# Patient Record
Sex: MALE | Race: BLACK OR AFRICAN AMERICAN | NOT HISPANIC OR LATINO | Employment: FULL TIME | URBAN - METROPOLITAN AREA
[De-identification: names, ages, dates, MRNs, and addresses within clinical notes are randomized per-mention and may not be internally consistent; named-entity substitution may affect disease eponyms.]

---

## 2019-08-17 ENCOUNTER — HOSPITAL ENCOUNTER (EMERGENCY)
Facility: HOSPITAL | Age: 49
Discharge: HOME | End: 2019-08-17
Attending: EMERGENCY MEDICINE
Payer: COMMERCIAL

## 2019-08-17 VITALS
RESPIRATION RATE: 17 BRPM | HEIGHT: 71 IN | SYSTOLIC BLOOD PRESSURE: 137 MMHG | OXYGEN SATURATION: 98 % | BODY MASS INDEX: 32.2 KG/M2 | TEMPERATURE: 98.2 F | DIASTOLIC BLOOD PRESSURE: 92 MMHG | WEIGHT: 230 LBS | HEART RATE: 85 BPM

## 2019-08-17 DIAGNOSIS — H92.01 RIGHT EAR PAIN: Primary | ICD-10-CM

## 2019-08-17 PROCEDURE — 99282 EMERGENCY DEPT VISIT SF MDM: CPT

## 2019-08-17 PROCEDURE — 99283 EMERGENCY DEPT VISIT LOW MDM: CPT | Performed by: EMERGENCY MEDICINE

## 2019-08-18 NOTE — ED PROVIDER NOTES
Pt Name: Wm Bhandari  MRN: [id number]  [place] 1970  Age/Sex: 53 y o  male  Date of evaluation: 8/17/2019  PCP: Elodia Pulido MD    [address] [place] [address]    Chief Complaint   Patient presents with    Ear Problem     pt c/o his right ear feeling clogged, pt states it started eysterday but has had no improvement         HPI    Imani Maciel presents to the Emergency Department complaining of fullness in his right ear  He slept on that side and then felt pressure and a clogged sensation  He feels like if he could "pop" it, the it would feel better  No fever  No other symptoms  HPI      Past Medical and Surgical History    Past Medical History:   Diagnosis Date    Diabetes mellitus ([facility] [address] )     Hyperlipidemia     Hypertension        History reviewed  No pertinent surgical history  History reviewed  No pertinent family history  Social History     Tobacco Use    Smoking status: Never Smoker    Smokeless tobacco: Never Used   Substance Use Topics    Alcohol use: Never     Frequency: Never    Drug use: Never           Allergies    Allergies   Allergen Reactions    Glipizide      Side/flank was hurting       Home Medications    Prior to Admission medications    Medication Sig Start Date End Date Taking? Authorizing Provider   amLODIPine (NORVASC) 5 mg tablet Take 5 mg by mouth daily 2/25/19   Historical Provider, MD   INVOKANA 300 MG TABS Take 300 mg by mouth daily 3/26/19   Historical Provider, MD   JANUMEALEXY  MG per tablet Take 1 tablet by mouth 2 (two) times a day 2/25/19   Historical Provider, MD   VIAGRA 100 MG tablet TAKE 1 TABLET BY MOUTH AS DIRECTED, DO NOT REPEAT FOR 24 HOURS 3/22/19   Historical Provider, MD           Review of Systems    Review of Systems   Constitutional: Negative for activity change, appetite change, chills, fatigue and fever  HENT: Positive for ear pain  Negative for congestion, rhinorrhea, sinus pressure, sneezing, sore throat and trouble swallowing      Eyes: Negative for photophobia and visual disturbance  Respiratory: Negative for chest tightness, shortness of breath and wheezing  Cardiovascular: Negative for chest pain and leg swelling  Gastrointestinal: Negative for abdominal distention, abdominal pain, constipation, diarrhea, nausea and vomiting  Endocrine: Negative for polydipsia, polyphagia and polyuria  Genitourinary: Negative for decreased urine volume, difficulty urinating, dysuria, flank pain, frequency and urgency  Musculoskeletal: Negative for back pain, gait problem, joint swelling and neck pain  Skin: Negative for color change, pallor and rash  Allergic/Immunologic: Negative for immunocompromised state  Neurological: Negative for seizures, syncope, speech difficulty, weakness, light-headedness and headaches  Psychiatric/Behavioral: Negative for confusion  All other systems reviewed and are negative  Physical Exam      ED Triage Vitals [08/17/19 2040]   Temperature Pulse Respirations Blood Pressure SpO2   98 2 °F (36 8 °C) 85 17 137/92 98 %      Temp Source Heart Rate Source Patient Position - Orthostatic VS BP Location FiO2 (%)   Oral Monitor Sitting Right arm --      Pain Score       No Pain               Physical Exam   Constitutional: He is oriented to person, place, and time  He appears well-developed and well-nourished  No distress  HENT:   Head: Normocephalic and atraumatic  Right Ear: No drainage, swelling or tenderness  Tympanic membrane is not injected, not perforated, not erythematous, not retracted and not bulging  A middle ear effusion is present  No decreased hearing is noted  Nose: Nose normal    Mouth/Throat: Oropharynx is clear and moist    Significant soft cerumen is present without impaction  Eyes: Pupils are equal, round, and reactive to light  Conjunctivae and EOM are normal    Neck: Normal range of motion  Neck supple  Cardiovascular: Normal rate, regular rhythm and normal heart sounds   Exam reveals no gallop and no friction rub  No murmur heard  Pulmonary/Chest: Effort normal and breath sounds normal  No respiratory distress  He has no wheezes  He has no rales  Abdominal: Soft  Bowel sounds are normal  There is no tenderness  There is no rebound and no guarding  Musculoskeletal: Normal range of motion  Neurological: He is alert and oriented to person, place, and time  Skin: Skin is warm and dry  He is not diaphoretic  Psychiatric: He has a normal mood and affect  His behavior is normal    Nursing note and vitals reviewed  Assessment and Plan    Vane Joya is a 53 y o  male who presents with ear pressure and clogged feeling   Physical examination remarkable for soft cerumen but otherwise normal   There is some fluid/ effusion as well  Plan will be to perform diagnostic testing and treat symptomatically  MDM    Diagnostic Results      Labs:    No results found for this or any previous visit  All labs reviewed and utilized in the medical decision making process    Radiology:    No orders to display       All radiology studies independently viewed by me and interpreted by the radiologist     Procedure    Procedures    [facility]      ED Course of Care and Re-Assessments        Medications - No data to display        FINAL IMPRESSION    Final diagnoses:   Right ear pain         DISPOSITION/PLAN      Time reflects when diagnosis was documented in both MDM as applicable and the Disposition within this note     Time User Action Codes Description Comment    8/17/2019  9:05 PM Delores Ordoñez Add [H92 01] Right ear pain       ED Disposition     ED Disposition Condition Date/Time Comment    Discharge Stable Sat Aug 17, 2019  9:04 PM Vane Joya discharge to home/self care              Follow-up Information     Follow up With Specialties Details Why Edie Soto MD  Schedule an appointment as soon as possible for a visit   [address] [facility] [address] [facility]       Simi Young MD Otolaryngology Schedule an appointment as soon as possible for a visit   [place] [address]  [phone]              PATIENT REFERRED TO:    Sagar Pittman MD  [address] [place] [address]  [phone]    Schedule an appointment as soon as possible for a visit       Simi Young MD  [address] [place] [address]  [phone]    Schedule an appointment as soon as possible for a visit         DISCHARGE MEDICATIONS:    Patient's Medications   Discharge Prescriptions    CARBAMIDE PEROXIDE (DEBROX) 6 5 % OTIC SOLUTION    Administer 5 drops into ears 2 (two) times a day       Start Date: 8/17/2019 End Date: --       Order Dose: 5 drops       Quantity: 15 mL    Refills: 0       No discharge procedures on file           Simeon Daniel, [address] [place] [address],   08/17/19 2472

## 2022-06-01 ENCOUNTER — HOSPITAL ENCOUNTER (EMERGENCY)
Facility: HOSPITAL | Age: 52
Discharge: HOME | End: 2022-06-01
Attending: EMERGENCY MEDICINE | Admitting: EMERGENCY MEDICINE
Payer: COMMERCIAL

## 2022-06-01 VITALS
HEART RATE: 89 BPM | BODY MASS INDEX: 29.26 KG/M2 | SYSTOLIC BLOOD PRESSURE: 147 MMHG | RESPIRATION RATE: 18 BRPM | WEIGHT: 209 LBS | HEIGHT: 71 IN | DIASTOLIC BLOOD PRESSURE: 79 MMHG | TEMPERATURE: 98.2 F | OXYGEN SATURATION: 100 %

## 2022-06-01 DIAGNOSIS — H61.21 IMPACTED CERUMEN OF RIGHT EAR: Primary | ICD-10-CM

## 2022-06-01 DIAGNOSIS — H91.90 HEARING LOSS: ICD-10-CM

## 2022-06-01 PROCEDURE — 99284 EMERGENCY DEPT VISIT MOD MDM: CPT | Performed by: EMERGENCY MEDICINE

## 2022-06-01 PROCEDURE — 69209 REMOVE IMPACTED EAR WAX UNI: CPT | Performed by: EMERGENCY MEDICINE

## 2022-06-01 PROCEDURE — 99283 EMERGENCY DEPT VISIT LOW MDM: CPT

## 2022-06-01 NOTE — ED PROVIDER NOTES
Pt Name: Jose M Kelly  MRN: [id number]  [place] 1970  Age/Sex: 49 y o  male  Date of evaluation: 6/1/2022  PCP: Deepa Garcia MD    [address] [place] [address]    Chief Complaint   Patient presents with    Hearing Problem     Patient reports difficulty hearing out of right ear that began about 1 week ago  Patient states that he can here its just really low or it sounds like his ear is clogged  No other symptoms described         HPI    49 y o  male presenting with inability to hear out of the right ear  He states that about a week ago he began having decreasing hearing in the right ear, states that he feels that it is clogged  He tried Debrox at home without relief  He denies pain, nausea, vomiting, diarrhea, numbness, weakness, changes in speech or vision, trauma, other symptoms  HPI      Past Medical and Surgical History    Past Medical History:   Diagnosis Date    Diabetes mellitus ([facility] [address] )     Hyperlipidemia     Hypertension        History reviewed  No pertinent surgical history  History reviewed  No pertinent family history  Social History     Tobacco Use    Smoking status: Never Smoker    Smokeless tobacco: Never Used   Substance Use Topics    Alcohol use: Never    Drug use: Never           Allergies    Allergies   Allergen Reactions    Glipizide Other (See Comments)     Side/flank was hurting-     6/1/2022 patient states that he does not recall this and reports no other allergies       Home Medications    Prior to Admission medications    Medication Sig Start Date End Date Taking?  Authorizing Provider   amLODIPine (NORVASC) 5 mg tablet Take 5 mg by mouth daily 2/25/19   Historical Provider, MD   carbamide peroxide (DEBROX) 6 5 % otic solution Administer 5 drops into ears 2 (two) times a day 8/17/19   Nery Lyons DO   INVOKANA 300 MG TABS Take 300 mg by mouth daily 3/26/19   Historical Provider, MD BABB  MG per tablet Take 1 tablet by mouth 2 (two) times a day 2/25/19 Historical Provider, MD   VIAGRA 100 MG tablet TAKE 1 TABLET BY MOUTH AS DIRECTED, DO NOT REPEAT FOR 24 HOURS 3/22/19   Historical Provider, MD           Review of Systems    Review of Systems   Constitutional: Negative for appetite change, chills and diaphoresis  HENT: Positive for hearing loss  Negative for drooling, facial swelling, trouble swallowing and voice change  Respiratory: Negative for apnea, shortness of breath and wheezing  Cardiovascular: Negative for chest pain and leg swelling  Gastrointestinal: Negative for abdominal distention, abdominal pain, diarrhea, nausea and vomiting  Genitourinary: Negative for dysuria and urgency  Musculoskeletal: Negative for arthralgias, back pain, gait problem and neck pain  Skin: Negative for color change, rash and wound  Neurological: Negative for seizures, speech difficulty, weakness and headaches  Psychiatric/Behavioral: Negative for agitation, behavioral problems and dysphoric mood  The patient is not nervous/anxious  All other systems reviewed and negative  Physical Exam      ED Triage Vitals   Temperature Pulse Respirations Blood Pressure SpO2   06/01/22 0119 06/01/22 0116 06/01/22 0116 06/01/22 0116 06/01/22 0116   98 2 °F (36 8 °C) 88 18 146/77 99 %      Temp Source Heart Rate Source Patient Position - Orthostatic VS BP Location FiO2 (%)   06/01/22 0119 06/01/22 0116 06/01/22 0116 06/01/22 0116 --   Oral Monitor Sitting Right arm       Pain Score       06/01/22 0116       No Pain               Physical Exam  Vitals and nursing note reviewed  Constitutional:       Appearance: He is well-developed  HENT:      Head: Normocephalic and atraumatic  Right Ear: External ear normal  There is impacted cerumen  Left Ear: Tympanic membrane, ear canal and external ear normal       Ears:      Comments: Small amount of earwax noted in left ear, right ear completely occluded with cerumen    Eyes:      Conjunctiva/sclera: Conjunctivae normal       Pupils: Pupils are equal, round, and reactive to light  Neck:      Trachea: No tracheal deviation  Cardiovascular:      Rate and Rhythm: Normal rate and regular rhythm  Heart sounds: Normal heart sounds  No murmur heard  Pulmonary:      Effort: Pulmonary effort is normal  No respiratory distress  Breath sounds: Normal breath sounds  No stridor  No wheezing or rales  Abdominal:      General: There is no distension  Palpations: Abdomen is soft  Tenderness: There is no abdominal tenderness  There is no guarding or rebound  Musculoskeletal:         General: No deformity  Normal range of motion  Cervical back: Normal range of motion and neck supple  Skin:     General: Skin is warm and dry  Findings: No rash  Neurological:      Mental Status: He is alert and oriented to person, place, and time  Psychiatric:         Behavior: Behavior normal          Thought Content: Thought content normal          Judgment: Judgment normal               Diagnostic Results      Labs:    Results Reviewed     None          All labs reviewed and utilized in the medical decision making process    Radiology:    No orders to display       All radiology studies independently viewed by me and interpreted by the radiologist     Procedure    Ear cerumen removal    Date/Time: 6/1/2022 1:20 AM  Performed by: Fernando Trimble MD  Authorized by: Fernando Trimble MD   Universal Protocol:  Consent: Verbal consent obtained  Risks and benefits: risks, benefits and alternatives were discussed  Consent given by: patient  Time out: Immediately prior to procedure a "time out" was called to verify the correct patient, procedure, equipment, support staff and site/side marked as required    Patient identity confirmed: provided demographic data      Patient location:  ED  Indications / Diagnosis:  Hearing loss  Procedure details:     Local anesthetic:  None    Location:  R ear    Procedure type: irrigation with instrumentation      Instrumentation: curette      Approach:  Natural orifice    Visualization (free text):  Ear canal completely occluded with ear wax  Post-procedure details:     Complication:  None    Hearing quality:  Normal    Patient tolerance of procedure: Tolerated well, no immediate complications  Comments:      Initial curettage performed, earwax very hard difficult to move with plastic curette  Patient then placed supine with right ear upwards, soaked with a mixture of hydrogen peroxide water for 10 minutes  Afterwards, further curettage performed along with irrigation with 18 gauge Angiocath and 10 cc syringe  After multiple rounds of irrigation and curettage, ear wax was removed with restoration of hearing  ED Course of Care and Re-Assessments      Medications - No data to display        FINAL IMPRESSION    Final diagnoses:   Impacted cerumen of right ear   Hearing loss         DISPOSITION/PLAN    Presentation as above most consistent hearing loss secondary to cerumen impaction  Disimpacted as above with resolution of symptoms, tympanic membranes visualized and intact after instrumentation  Patient counseled regarding prevention of recurrence, referred to ENT, discharged strict return precautions  Time reflects when diagnosis was documented in both MDM as applicable and the Disposition within this note     Time User Action Codes Description Comment    6/1/2022  1:58 AM Ketty Brito T Add [H61 21] Impacted cerumen of right ear     6/1/2022  1:59 AM Billie Gomez Add [H91 90] Hearing loss       ED Disposition     ED Disposition   Discharge    Condition   Stable    Date/Time   Wed Jun 1, 2022  1:58 AM    Comment   Alejo Peraza discharge to home/self care                 Follow-up Information     Follow up With Specialties Details Why Contact Info Additional [facility] Emergency Department Emergency Medicine Go to  If symptoms worsen [address] [place] [address]  [address] [facility] Emergency Department, [address] [facility],[address] [place], [address]    Catherine Maldonado MD  Call  As needed [address] [facility] [address], [address] Otolaryngology Call in 1 day To discuss this visit and schedule close outpatient follow-up  Consider regularly scheduled ear cleanings [address] [facility] [address] [facility], [address]  [place], [place] [address]            PATIENT REFERRED TO:    [address] [facility] Emergency Department  [address] [facility] [address] [phone]  Go to   If symptoms worsen    Catherine Maldonado MD  [address] [place] [address]  [phone]    Call   As needed    [facility] [address] [place] [address]  Call in 1 day  To discuss this visit and schedule close outpatient follow-up  Consider regularly scheduled ear cleanings      DISCHARGE MEDICATIONS:    Discharge Medication List as of 6/1/2022  1:59 AM      CONTINUE these medications which have NOT CHANGED    Details   amLODIPine (NORVASC) 5 mg tablet Take 5 mg by mouth daily, Starting Mon 2/25/2019, Historical Med      carbamide peroxide (DEBROX) 6 5 % otic solution Administer 5 drops into ears 2 (two) times a day, Starting Sat 8/17/2019, Print      INVOKANA 300 MG TABS Take 300 mg by mouth daily, Starting Tue 3/26/2019, Historical Med      JANUMET  MG per tablet Take 1 tablet by mouth 2 (two) times a day, Starting Mon 2/25/2019, Historical Med      VIAGRA 100 MG tablet TAKE 1 TABLET BY MOUTH AS DIRECTED, DO NOT REPEAT FOR 24 HOURS, Historical Med             No discharge procedures on file           Hermilo Dorantes MD    Portions of the record may have been created with voice recognition software  Occasional wrong word or "sound alike" substitutions may have occurred due to the inherent limitations of voice recognition software    Please read the chart carefully and recognize, using context, where substitutions have occurred     Mellisa Hearn MD  06/01/22 4929